# Patient Record
Sex: FEMALE | Race: WHITE | URBAN - METROPOLITAN AREA
[De-identification: names, ages, dates, MRNs, and addresses within clinical notes are randomized per-mention and may not be internally consistent; named-entity substitution may affect disease eponyms.]

---

## 2017-05-25 ENCOUNTER — HISTORICAL (OUTPATIENT)
Dept: PREADMISSION TESTING | Facility: HOSPITAL | Age: 52
End: 2017-05-25

## 2017-05-25 LAB
ABS NEUT (OLG): 2.93 X10(3)/MCL (ref 2.1–9.2)
ALBUMIN SERPL-MCNC: 4.2 GM/DL (ref 3.4–5)
ALBUMIN/GLOB SERPL: 1.2 RATIO (ref 1.1–2)
ALP SERPL-CCNC: 86 UNIT/L (ref 38–126)
ALT SERPL-CCNC: 19 UNIT/L (ref 12–78)
APTT PPP: 30.9 SECOND(S) (ref 20.6–36)
AST SERPL-CCNC: 15 UNIT/L (ref 15–37)
BASOPHILS # BLD AUTO: 0.1 X10(3)/MCL (ref 0–0.2)
BASOPHILS NFR BLD AUTO: 1 %
BILIRUB SERPL-MCNC: 0.5 MG/DL (ref 0.2–1)
BILIRUBIN DIRECT+TOT PNL SERPL-MCNC: 0.1 MG/DL (ref 0–0.5)
BILIRUBIN DIRECT+TOT PNL SERPL-MCNC: 0.4 MG/DL (ref 0–0.8)
BUN SERPL-MCNC: 17 MG/DL (ref 7–18)
CALCIUM SERPL-MCNC: 9.2 MG/DL (ref 8.5–10.1)
CHLORIDE SERPL-SCNC: 105 MMOL/L (ref 98–107)
CO2 SERPL-SCNC: 27 MMOL/L (ref 21–32)
CREAT SERPL-MCNC: 0.81 MG/DL (ref 0.55–1.02)
EOSINOPHIL # BLD AUTO: 0.1 X10(3)/MCL (ref 0–0.9)
EOSINOPHIL NFR BLD AUTO: 1 %
ERYTHROCYTE [DISTWIDTH] IN BLOOD BY AUTOMATED COUNT: 13.5 % (ref 11.5–17)
GLOBULIN SER-MCNC: 3.5 GM/DL (ref 2.4–3.5)
GLUCOSE SERPL-MCNC: 81 MG/DL (ref 74–106)
HCT VFR BLD AUTO: 40.7 % (ref 37–47)
HGB BLD-MCNC: 13 GM/DL (ref 12–16)
INR PPP: 0.95 (ref 0–1.27)
LYMPHOCYTES # BLD AUTO: 2.7 X10(3)/MCL (ref 0.6–4.6)
LYMPHOCYTES NFR BLD AUTO: 42 %
MCH RBC QN AUTO: 28.3 PG (ref 27–31)
MCHC RBC AUTO-ENTMCNC: 31.9 GM/DL (ref 33–36)
MCV RBC AUTO: 88.7 FL (ref 80–94)
MONOCYTES # BLD AUTO: 0.6 X10(3)/MCL (ref 0.1–1.3)
MONOCYTES NFR BLD AUTO: 9 %
NEUTROPHILS # BLD AUTO: 2.93 X10(3)/MCL (ref 2.1–9.2)
NEUTROPHILS NFR BLD AUTO: 46 %
PLATELET # BLD AUTO: 267 X10(3)/MCL (ref 130–400)
PMV BLD AUTO: 10.6 FL (ref 9.4–12.4)
POTASSIUM SERPL-SCNC: 5.3 MMOL/L (ref 3.5–5.1)
PROT SERPL-MCNC: 7.7 GM/DL (ref 6.4–8.2)
PROTHROMBIN TIME: 12.5 SECOND(S) (ref 12.1–14.2)
RBC # BLD AUTO: 4.59 X10(6)/MCL (ref 4.2–5.4)
SODIUM SERPL-SCNC: 140 MMOL/L (ref 136–145)
WBC # SPEC AUTO: 6.4 X10(3)/MCL (ref 4.5–11.5)

## 2017-06-14 ENCOUNTER — HISTORICAL (OUTPATIENT)
Dept: ADMINISTRATIVE | Facility: HOSPITAL | Age: 52
End: 2017-06-14

## 2022-04-30 NOTE — OP NOTE
DATE OF SURGERY:    06/14/2017    SURGEON:  Andrew Clinton MD    PREOPERATIVE DIAGNOSIS:  Aging face.    POSTOPERATIVE DIAGNOSIS:  Aging face.    PROCEDURE:    1. Endoscopic brow lift.  2. Upper lid blepharoplasty.  3. Cervicofacial liposuction and platysmaplasty and rhytidectomy.    ASSISTANT:  Laura Raines RN    PROCEDURE IN DETAIL:  The patient was brought to the operating room and placed in supine position. After achieving general endotracheal anesthesia, a combination of 1% lidocaine with 1:100,000 epinephrine and 0.5% lidocaine with 1:100,000 epinephrine was injected into the soft tissue marked for undermining. The face was prepped and draped for procedure.       We began the endoscopic brow lift making a central incision and two lateral incisions made in the hair bearing scalp approximately 1 cm from the hairline down to the periosteum where the periosteum was incised and an anterior cavity was dissected. With the use of periosteal elevators, a large amount of the forehead was done without endoscopic guidance down to an area of approximately 1.5 cm above the supraorbital rims. From that point, the 30 degree endoscope was used to dissect the arcus marginalis with care being taken to identify both supraorbital and supratrochlear nerves while teasing the  muscles significantly to less their motion. Once this was completed, we turned our attention to the temporal cavities which were dissected by making a skin incision in the hair bearing scalp down to the superficial layer of the deep temporal fascia and then with the use of a 30 degree endoscope as well as the temporal dissector the temporal pockets were dissected and connected to the anterior cavity by incision the periosteum over the lateral orbital rim and then connecting them by moving the elevator from inferior to superior from the lateral pocket medially to the anterior pocket. Hemostasis was achieved where necessary using the  bipolar cautery. This was done bilaterally. A 2-0 PDS suture was used in mattress fashion to suture the soft tissue to the deep temporal fascia. Once this was completed, the posterior dissection was also done in a subperiosteal plane to the nuchal line. Once this was done, the scalp was retracted posteriorly and ultratines was placed through both lateral incisions, Evicel sprayed and the ultratines engaged. All incisions were then closed with clips.       We turned our attention to the upper lid blepharoplasty where corneal protectors were placed to protect both eyes.  A predetermined amount of upper eyelid skin was removed using sharp dissection.  A strip of orbicularis oculi muscle was taken and hemostasis was achieved using Bipolar cautery.  The fat from the nasal compartments was removed using the clamp, cut and cautery technique and the lids were closed with 6-0 nylon in a running fashion.  This procedure was done bilaterally.         The face and neck were prepped and draped for a facelift surgery.  Once this was completed, facelift incisions were made in the pre-auricular area as well as the post-auricular areas and out to the occipital scalp, as well as the small incision that was made in the submental region.       With this, close liposuction was done by making tunnels prior to turning the vacuum going extensively into the submental region as well as into the jowls bilaterally.  Once the tunnels were made, the vacuum was turned on and liposuction was carried out throughout both jowls as well as in the submental region with care being taken to leave the open end of the cannula downward and using a rapid to and fro motion, until an appropriate amount of fat was removed.  Then the medial border of the platysma were grasped and sutured in running lock fashion with 3-0 Vicryl suture.       Once this was done, we turned our attention to the facelift where anterior, inferior and posterior flaps were elevated in  the subcutaneous plane.  Hemostasis was achieved using the bipolar cautery.  A limited amount of liposuction was done in the pre-auricular region and infralobular region and hemostasis was achieved using the bipolar cautery.  Using a 2-0 Prolene suture the posterior edge of the platysma was sutured to mastoid periosteum.  Using 2-0 PDS sutures, the SMAS was plicated.  Once this was done, the excess skin in front of the ears was pulled anteriorly and behind the ear posteriorly and the excess skin was removed.  The skin incisions in the scalp were closed with clips while the pre-auricular incisions was closed with 5-0 fast absorbing gut and the post-auricular incisions were closed with 5-0 plain sutures all in a running lock fashion after spraying Crosseal diffusely over the underlying tissue.  This procedure was done bilaterally.       The patient tolerated the procedure well and was extubated and awakened in the operating room and brought to the recovery room in stable condition.        ______________________________  MD MOLLY Valencia/MANUELITO  DD:  06/14/2017  Time:  11:50AM  DT:  06/15/2017  Time:  08:40AM  Job #:  [id number]